# Patient Record
Sex: FEMALE | Race: OTHER
[De-identification: names, ages, dates, MRNs, and addresses within clinical notes are randomized per-mention and may not be internally consistent; named-entity substitution may affect disease eponyms.]

---

## 2020-12-12 ENCOUNTER — HOSPITAL ENCOUNTER (INPATIENT)
Dept: HOSPITAL 54 - ER | Age: 40
LOS: 2 days | Discharge: HOME | DRG: 53 | End: 2020-12-14
Attending: FAMILY MEDICINE | Admitting: FAMILY MEDICINE
Payer: MEDICAID

## 2020-12-12 VITALS — WEIGHT: 178 LBS | BODY MASS INDEX: 26.98 KG/M2 | HEIGHT: 68 IN

## 2020-12-12 VITALS — SYSTOLIC BLOOD PRESSURE: 116 MMHG | DIASTOLIC BLOOD PRESSURE: 79 MMHG

## 2020-12-12 DIAGNOSIS — E87.1: ICD-10-CM

## 2020-12-12 DIAGNOSIS — G40.909: Primary | ICD-10-CM

## 2020-12-12 DIAGNOSIS — E87.6: ICD-10-CM

## 2020-12-12 LAB
BASOPHILS # BLD AUTO: 0.1 /CMM (ref 0–0.2)
BASOPHILS NFR BLD AUTO: 0.6 % (ref 0–2)
BUN SERPL-MCNC: 12 MG/DL (ref 7–18)
CALCIUM SERPL-MCNC: 8.5 MG/DL (ref 8.5–10.1)
CHLORIDE SERPL-SCNC: 103 MMOL/L (ref 98–107)
CO2 SERPL-SCNC: 20 MMOL/L (ref 21–32)
CREAT SERPL-MCNC: 0.7 MG/DL (ref 0.6–1.3)
EOSINOPHIL NFR BLD AUTO: 0.4 % (ref 0–6)
GLUCOSE SERPL-MCNC: 81 MG/DL (ref 74–106)
HCT VFR BLD AUTO: 43 % (ref 33–45)
HGB BLD-MCNC: 14.5 G/DL (ref 11.5–14.8)
LYMPHOCYTES NFR BLD AUTO: 0.7 /CMM (ref 0.8–4.8)
LYMPHOCYTES NFR BLD AUTO: 7.3 % (ref 20–44)
MCHC RBC AUTO-ENTMCNC: 34 G/DL (ref 31–36)
MCV RBC AUTO: 88 FL (ref 82–100)
MONOCYTES NFR BLD AUTO: 0.4 /CMM (ref 0.1–1.3)
MONOCYTES NFR BLD AUTO: 4.1 % (ref 2–12)
NEUTROPHILS # BLD AUTO: 8.5 /CMM (ref 1.8–8.9)
NEUTROPHILS NFR BLD AUTO: 87.6 % (ref 43–81)
PLATELET # BLD AUTO: 271 /CMM (ref 150–450)
POTASSIUM SERPL-SCNC: 3.7 MMOL/L (ref 3.5–5.1)
RBC # BLD AUTO: 4.91 MIL/UL (ref 4–5.2)
SODIUM SERPL-SCNC: 134 MMOL/L (ref 136–145)
WBC NRBC COR # BLD AUTO: 9.7 K/UL (ref 4.3–11)

## 2020-12-12 PROCEDURE — G0378 HOSPITAL OBSERVATION PER HR: HCPCS

## 2020-12-12 PROCEDURE — C9803 HOPD COVID-19 SPEC COLLECT: HCPCS

## 2020-12-12 RX ADMIN — LEVETIRACETAM SCH MG: 250 TABLET, FILM COATED ORAL at 22:47

## 2020-12-12 NOTE — NUR
pt. brought up to floor from er.hooked up to tele rhythm stach rate of 115.skin warm and dry 
vs stable. instructed not to get oob.Polish speaking.a little sluggish from meds in 
er.call bell within reach.

## 2020-12-12 NOTE — NUR
[id number] FROM HOME FOR SEIZURE. HX OF SEIZURE. , TO ER BED 10, HOOKED TO 
MONITOR, CHANGED TO HOSP GOWN, WARM BLANKET PROVIDED. DR VITALE AT BEDSIDE

## 2020-12-12 NOTE — NUR
tele rn note 



administered prn tylenol 650mg for c/o headache and request per patient. will continue to 
monitor.

## 2020-12-12 NOTE — NUR
TELE RN OPENING NOTE 



RECEIVED PATIENT IN BED. A/OX4. TOLERATING ROOM AIR. RESPIRATIONS ARE EVEN AND UNLABORED. NO 
S/S SOB NOTED. C/O HEADACHE. EXTERNAL TELE MONITOR READ SINUS TACHY 104. IN NO APPARENT 
DISTRESS. IV ACCESS IN LAC#20 PATENT AND SALINE LOCKED. BED IS LOW AND LOCKED, HOB ELEVATED 
IN SEMI FOWLERS, SIDE RAILS UP X2, PADDEDD, CALL LIGHT WITHIN REACH. WILL CONTINUE TO 
MONITOR.

## 2020-12-13 VITALS — SYSTOLIC BLOOD PRESSURE: 113 MMHG | DIASTOLIC BLOOD PRESSURE: 74 MMHG

## 2020-12-13 VITALS — SYSTOLIC BLOOD PRESSURE: 119 MMHG | DIASTOLIC BLOOD PRESSURE: 68 MMHG

## 2020-12-13 VITALS — SYSTOLIC BLOOD PRESSURE: 109 MMHG | DIASTOLIC BLOOD PRESSURE: 66 MMHG

## 2020-12-13 VITALS — DIASTOLIC BLOOD PRESSURE: 67 MMHG | SYSTOLIC BLOOD PRESSURE: 105 MMHG

## 2020-12-13 VITALS — SYSTOLIC BLOOD PRESSURE: 116 MMHG | DIASTOLIC BLOOD PRESSURE: 70 MMHG

## 2020-12-13 LAB
BASOPHILS # BLD AUTO: 0.1 /CMM (ref 0–0.2)
BASOPHILS NFR BLD AUTO: 0.7 % (ref 0–2)
BUN SERPL-MCNC: 23 MG/DL (ref 7–18)
CALCIUM SERPL-MCNC: 9.2 MG/DL (ref 8.5–10.1)
CHLORIDE SERPL-SCNC: 104 MMOL/L (ref 98–107)
CHOLEST SERPL-MCNC: 226 MG/DL (ref ?–200)
CO2 SERPL-SCNC: 23 MMOL/L (ref 21–32)
CREAT SERPL-MCNC: 1.1 MG/DL (ref 0.6–1.3)
EOSINOPHIL NFR BLD AUTO: 0.2 % (ref 0–6)
GLUCOSE SERPL-MCNC: 119 MG/DL (ref 74–106)
HCT VFR BLD AUTO: 40 % (ref 33–45)
HDLC SERPL-MCNC: 72 MG/DL (ref 40–60)
HGB BLD-MCNC: 13.6 G/DL (ref 11.5–14.8)
LDLC SERPL DIRECT ASSAY-MCNC: 146 MG/DL (ref 0–99)
LYMPHOCYTES NFR BLD AUTO: 1.1 /CMM (ref 0.8–4.8)
LYMPHOCYTES NFR BLD AUTO: 11.6 % (ref 20–44)
MAGNESIUM SERPL-MCNC: 2.7 MG/DL (ref 1.8–2.4)
MCHC RBC AUTO-ENTMCNC: 34 G/DL (ref 31–36)
MCV RBC AUTO: 87 FL (ref 82–100)
MONOCYTES NFR BLD AUTO: 0.9 /CMM (ref 0.1–1.3)
MONOCYTES NFR BLD AUTO: 9.7 % (ref 2–12)
NEUTROPHILS # BLD AUTO: 7.4 /CMM (ref 1.8–8.9)
NEUTROPHILS NFR BLD AUTO: 77.8 % (ref 43–81)
PHOSPHATE SERPL-MCNC: 4.3 MG/DL (ref 2.5–4.9)
PLATELET # BLD AUTO: 259 /CMM (ref 150–450)
POTASSIUM SERPL-SCNC: 3.3 MMOL/L (ref 3.5–5.1)
RBC # BLD AUTO: 4.63 MIL/UL (ref 4–5.2)
SODIUM SERPL-SCNC: 139 MMOL/L (ref 136–145)
TRIGL SERPL-MCNC: 75 MG/DL (ref 30–150)
WBC NRBC COR # BLD AUTO: 9.5 K/UL (ref 4.3–11)

## 2020-12-13 RX ADMIN — LEVETIRACETAM SCH MG: 250 TABLET, FILM COATED ORAL at 21:43

## 2020-12-13 RX ADMIN — LEVETIRACETAM SCH MG: 250 TABLET, FILM COATED ORAL at 08:16

## 2020-12-13 NOTE — NUR
TELE RN CLOSING NOTE 



PATIENT RESTING IN BED. A/OX4. REMAINS TOLERATING ROOM AIR. NO RESP DISTRESS. STATES TO 
STILL HAVE A HEADACHE. EXTERNAL TELE MONITOR READ SINUS TACHY. NO DISTRESS. NO SEIZURE 
ACTIVITY. IV ACCESS MAINTAINED IN LAC#20 PATENT AND SALINE LOCKED. BED REMAINS LOW AND 
LOCKED, HOB ELEVATED IN SEMI FOWLERS, SIDE RAILS UP X2, PADDEDD, CALL LIGHT WITHIN REACH. 
WILL ENDORSE TO NEXT SHIFT.

## 2020-12-13 NOTE — NUR
MS RN OPENING NOTE 



RECEIVED PATIENT IN BED. A/OX4. TOLERATING ROOM AIR. RESPIRATIONS ARE EVEN AND UNLABORED. NO 
S/S SOB NOTED.NO C/O PAIN AT THIS TIME.  IN NO APPARENT DISTRESS. IV ACCESS IN LAC#20. BED 
IS LOW AND LOCKED, HOB ELEVATED IN SEMI FOWLERS, SIDE RAILS UP X2, PADDEDD, CALL LIGHT 
WITHIN REACH. WILL CONTINUE TO MONITOR.

## 2020-12-13 NOTE — NUR
TELE/RN OPENING NOTE



Received patient awake in bed, A&O x 4. No complaints of pain/discomfort noted. Breathing 
even and non-labored on RA, no SOB noted. No respiratory or cardiac distress noted. On tele 
monitor, reading SR 84. IV access noted on RAC #20, patent and intact, and flushing well. 
Bed locked to its lowest position, side rails x 2 up, call light in hand. Seizure 
precautions maintained. Will continue with current medical management.

## 2020-12-13 NOTE — NUR
MS/RN NOTE



Patient appears well, resting in bed. No seizures noted at this time. Will continue to 
monitor.

## 2020-12-13 NOTE — NUR
MS/RN CLOSING NOTE



Patient resting in bed, A&O x 4. All needs met and attended to. No complaints of 
pain/discomfort noted. Breathing even and non-labored on RA, no SOB noted. No respiratory or 
cardiac distress noted. IV access noted on RAC #20, patent and intact, and flushing well. 
Fall precautions maintained. Seizure precautions maintained. Will endorse to night shift 
nurse.

## 2020-12-14 VITALS — DIASTOLIC BLOOD PRESSURE: 61 MMHG | SYSTOLIC BLOOD PRESSURE: 107 MMHG

## 2020-12-14 VITALS — SYSTOLIC BLOOD PRESSURE: 107 MMHG | DIASTOLIC BLOOD PRESSURE: 61 MMHG

## 2020-12-14 LAB
BASOPHILS # BLD AUTO: 0.1 /CMM (ref 0–0.2)
BASOPHILS NFR BLD AUTO: 1 % (ref 0–2)
BUN SERPL-MCNC: 17 MG/DL (ref 7–18)
CALCIUM SERPL-MCNC: 8.9 MG/DL (ref 8.5–10.1)
CHLORIDE SERPL-SCNC: 104 MMOL/L (ref 98–107)
CO2 SERPL-SCNC: 25 MMOL/L (ref 21–32)
CREAT SERPL-MCNC: 0.8 MG/DL (ref 0.6–1.3)
EOSINOPHIL NFR BLD AUTO: 0.4 % (ref 0–6)
GLUCOSE SERPL-MCNC: 96 MG/DL (ref 74–106)
HCT VFR BLD AUTO: 39 % (ref 33–45)
HGB BLD-MCNC: 13.6 G/DL (ref 11.5–14.8)
LYMPHOCYTES NFR BLD AUTO: 1.7 /CMM (ref 0.8–4.8)
LYMPHOCYTES NFR BLD AUTO: 22.7 % (ref 20–44)
MCHC RBC AUTO-ENTMCNC: 35 G/DL (ref 31–36)
MCV RBC AUTO: 85 FL (ref 82–100)
MONOCYTES NFR BLD AUTO: 0.6 /CMM (ref 0.1–1.3)
MONOCYTES NFR BLD AUTO: 8.3 % (ref 2–12)
NEUTROPHILS # BLD AUTO: 4.9 /CMM (ref 1.8–8.9)
NEUTROPHILS NFR BLD AUTO: 67.6 % (ref 43–81)
PLATELET # BLD AUTO: 272 /CMM (ref 150–450)
POTASSIUM SERPL-SCNC: 3.1 MMOL/L (ref 3.5–5.1)
RBC # BLD AUTO: 4.56 MIL/UL (ref 4–5.2)
SODIUM SERPL-SCNC: 140 MMOL/L (ref 136–145)
WBC NRBC COR # BLD AUTO: 7.3 K/UL (ref 4.3–11)

## 2020-12-14 RX ADMIN — LEVETIRACETAM SCH MG: 250 TABLET, FILM COATED ORAL at 08:36

## 2020-12-14 NOTE — NUR
DISCHARGE RN NOTE



PATIENT ALERT AND ORIENTED X 4, ON ROOM AIR WITH NO RESPIRATORY DISTRESS NOTED WITH EVEN 
NON-LABORED BREATHING, AND NO SOB. PATIENT SKIN KEPT CLEAN WARM AND DRY TO TOUCH. REMOVED IV 
ACCESS, CATHETER TIP INTACT AND APPLIED PRESSURE TO SITE. PATIENT ACCOUNTED FOR ALL 
BELONGINGS. VITAL SIGNS WNL. EXIT CARE PROVIDED TO PATIENT. SKIN ASSESSMENT DONE. PATIENT 
LEFT HOSPITAL VIA PRIVATE CAR.

## 2020-12-14 NOTE — NUR
ms rn note 



photos not taken per protocol, Qsunday. patient has photos taken within 24 hrs. assessed 
photos and skin,  no changes. will continue to monitor.

## 2020-12-14 NOTE — NUR
MS RN CLOSING NOTE 



PATIENT RESTING IN BED. A/OX4. REMAINS TOLERATING ROOM AIR.NO RESP DISTRESS. NO PAIN.  NO 
SEIZURE ACTIVITY. IV ACCESS MAINTAINED  RAC#20. BED REMAINS LOW AND LOCKED, HOB ELEVATED IN 
SEMI FOWLERS, SIDE RAILS UP X2, PADDEDD, CALL LIGHT WITHIN REACH. WILL ENDORSE TO NEXT SHIFT

## 2020-12-14 NOTE — NUR
MS RN NOTES



PATIENT RESTING IN BED, AWAKE, ALERT AND ORIENTED X 4, ON ROOM AIR WITH NO RESP DISTRESS, 
WITH EVEN NON-LABORED BREATHING, AND NO SOB NOTED. NO PAIN OR ACUTE DISTRESS NOTED. NO 
SEIZURE ACTIVITY AT THIS TIME. IV ACCESS INTACT AND PATENT ON  RAC#20. SAFETY PRECAUTIONS 
IMPLEMENTED WITH BED IN THE LOWEST AND LOCKED, HOB ELEVATED IN SEMI FOWLERS, SIDE RAILS UP 
X2, PADDED, CALL LIGHT WITHIN REACH. WILL CONTINUE TO MONITOR.